# Patient Record
Sex: FEMALE | URBAN - METROPOLITAN AREA
[De-identification: names, ages, dates, MRNs, and addresses within clinical notes are randomized per-mention and may not be internally consistent; named-entity substitution may affect disease eponyms.]

---

## 2021-01-25 ENCOUNTER — OFFICE VISIT (OUTPATIENT)
Dept: URBAN - METROPOLITAN AREA CLINIC 83 | Facility: CLINIC | Age: 86
End: 2021-01-25
Payer: MEDICARE

## 2021-01-25 DIAGNOSIS — H43.813 VITREOUS DEGENERATION, BILATERAL: ICD-10-CM

## 2021-01-25 PROCEDURE — 99213 OFFICE O/P EST LOW 20 MIN: CPT | Performed by: OPHTHALMOLOGY

## 2021-01-25 PROCEDURE — 67028 INJECTION EYE DRUG: CPT | Performed by: OPHTHALMOLOGY

## 2021-01-25 PROCEDURE — 92134 CPTRZ OPH DX IMG PST SGM RTA: CPT | Performed by: OPHTHALMOLOGY

## 2021-01-25 ASSESSMENT — INTRAOCULAR PRESSURE
OS: 17
OD: 16

## 2021-01-25 NOTE — IMPRESSION/PLAN
Impression: Exudative age-related macular degeneration, bilateral, with active choroidal neovascularization: H35.3231. Bilateral.
s/p Avastin OD 9/28/2020 Plan: --exam/OCT reveal mild, stable SRF OD, stable mac scar OS
--findings d/w pt, rec cont anti-VEGF tx OD to maintain vision --pt understands tx may not improve VA but should reduce the risk of further visual loss --r/b/a of Avastin OD for NVAMD discussed, pt understands Avastin is considered off-label for NVAMD
--pt elects to cont Avastin under OCT guidance, injection performed successfully w/o complication --pt instructed to call immediately with s/s VA loss/pain RTC 12 weeks OCT OU reeval Avastin

## 2021-09-13 ENCOUNTER — OFFICE VISIT (OUTPATIENT)
Dept: URBAN - METROPOLITAN AREA CLINIC 83 | Facility: CLINIC | Age: 86
End: 2021-09-13
Payer: MEDICARE

## 2021-09-13 PROCEDURE — 92134 CPTRZ OPH DX IMG PST SGM RTA: CPT | Performed by: OPHTHALMOLOGY

## 2021-09-13 PROCEDURE — 67028 INJECTION EYE DRUG: CPT | Performed by: OPHTHALMOLOGY

## 2021-09-13 PROCEDURE — 99214 OFFICE O/P EST MOD 30 MIN: CPT | Performed by: OPHTHALMOLOGY

## 2021-09-13 ASSESSMENT — INTRAOCULAR PRESSURE
OD: 20
OS: 17

## 2021-09-13 NOTE — IMPRESSION/PLAN
Impression: Exudative age-related macular degeneration, bilateral, with active choroidal neovascularization: H35.3231. Bilateral.
s/p Avastin OD 5/10/21 Plan: --pt lost to follow-up for 4 months
--exam/OCT reveal new-onset SRH (temporal), lg PED, increased SRF OD
--findings d/w pt, rec cont anti-VEGF OD to maintain vision  
--r/b/a of Avastin OD for NVAMD discussed --pt understands Avastin is considered off-label for NVAMD
--pt elects to cont Avastin under OCT guidance --injection OD performed successfully w/o complication --pt instructed to call immediately with s/s VA loss/pain RTC 6 weeks for Avastin OD 2/3

## 2021-10-18 ENCOUNTER — PROCEDURE (OUTPATIENT)
Dept: URBAN - METROPOLITAN AREA CLINIC 83 | Facility: CLINIC | Age: 86
End: 2021-10-18
Payer: MEDICARE

## 2021-10-18 DIAGNOSIS — H35.3231 EXUDATIVE AGE-RELATED MACULAR DEGENERATION, BILATERAL, WITH ACTIVE CHOROIDAL NEOVASCULARIZATION: Primary | ICD-10-CM

## 2021-10-18 PROCEDURE — 67028 INJECTION EYE DRUG: CPT | Performed by: OPHTHALMOLOGY

## 2021-10-18 ASSESSMENT — INTRAOCULAR PRESSURE
OD: 13
OS: 14

## 2021-11-15 ENCOUNTER — PROCEDURE (OUTPATIENT)
Dept: URBAN - METROPOLITAN AREA CLINIC 83 | Facility: CLINIC | Age: 86
End: 2021-11-15
Payer: MEDICARE

## 2021-11-15 PROCEDURE — 67028 INJECTION EYE DRUG: CPT | Performed by: OPHTHALMOLOGY

## 2021-11-15 ASSESSMENT — INTRAOCULAR PRESSURE
OD: 5
OS: 9

## 2021-12-20 ENCOUNTER — OFFICE VISIT (OUTPATIENT)
Dept: URBAN - METROPOLITAN AREA CLINIC 83 | Facility: CLINIC | Age: 86
End: 2021-12-20
Payer: MEDICARE

## 2021-12-20 DIAGNOSIS — Z96.1 PRESENCE OF INTRAOCULAR LENS: ICD-10-CM

## 2021-12-20 PROCEDURE — 99213 OFFICE O/P EST LOW 20 MIN: CPT | Performed by: OPHTHALMOLOGY

## 2021-12-20 PROCEDURE — 67028 INJECTION EYE DRUG: CPT | Performed by: OPHTHALMOLOGY

## 2021-12-20 PROCEDURE — 92134 CPTRZ OPH DX IMG PST SGM RTA: CPT | Performed by: OPHTHALMOLOGY

## 2021-12-20 ASSESSMENT — INTRAOCULAR PRESSURE
OD: 12
OS: 11

## 2021-12-20 NOTE — IMPRESSION/PLAN
Impression: Exudative age-related macular degeneration, bilateral, with active choroidal neovascularization: H35.3231. Bilateral.
s/p Avastin OD 11/15/21
last DFE OU 09/13/21 Plan: --exam/OCT reveal improving SRH (temporal), stable PED, improving SRF OD
--findings d/w pt, rec cont anti-VEGF OD to maintain vision  
--r/b/a of Avastin OD for NVAMD discussed --pt understands Avastin is considered off-label for NVAMD
--pt elects to cont Avastin under OCT guidance --injection OD performed successfully w/o complication --pt instructed to call immediately with s/s VA loss/pain RTC 6 weeks for Avastin OD #2/3 (new series)

## 2022-01-31 ENCOUNTER — PROCEDURE (OUTPATIENT)
Dept: URBAN - METROPOLITAN AREA CLINIC 83 | Facility: CLINIC | Age: 87
End: 2022-01-31
Payer: MEDICARE

## 2022-01-31 PROCEDURE — 67028 INJECTION EYE DRUG: CPT | Performed by: OPHTHALMOLOGY

## 2022-01-31 ASSESSMENT — INTRAOCULAR PRESSURE
OS: 14
OD: 15

## 2022-03-14 ENCOUNTER — PROCEDURE (OUTPATIENT)
Dept: URBAN - METROPOLITAN AREA CLINIC 83 | Facility: CLINIC | Age: 87
End: 2022-03-14
Payer: MEDICARE

## 2022-03-14 PROCEDURE — 67028 INJECTION EYE DRUG: CPT | Performed by: OPHTHALMOLOGY

## 2022-03-14 ASSESSMENT — INTRAOCULAR PRESSURE
OS: 16
OD: 18

## 2022-04-25 ENCOUNTER — OFFICE VISIT (OUTPATIENT)
Dept: URBAN - METROPOLITAN AREA CLINIC 83 | Facility: CLINIC | Age: 87
End: 2022-04-25
Payer: MEDICARE

## 2022-04-25 DIAGNOSIS — H35.3231 EXUDATIVE AGE-RELATED MACULAR DEGENERATION, BILATERAL, WITH ACTIVE CHOROIDAL NEOVASCULARIZATION: Primary | ICD-10-CM

## 2022-04-25 DIAGNOSIS — Z96.1 PRESENCE OF INTRAOCULAR LENS: ICD-10-CM

## 2022-04-25 DIAGNOSIS — H43.813 VITREOUS DEGENERATION, BILATERAL: ICD-10-CM

## 2022-04-25 PROCEDURE — 99213 OFFICE O/P EST LOW 20 MIN: CPT | Performed by: OPHTHALMOLOGY

## 2022-04-25 PROCEDURE — 92134 CPTRZ OPH DX IMG PST SGM RTA: CPT | Performed by: OPHTHALMOLOGY

## 2022-04-25 PROCEDURE — 67028 INJECTION EYE DRUG: CPT | Performed by: OPHTHALMOLOGY

## 2022-04-25 ASSESSMENT — INTRAOCULAR PRESSURE
OS: 17
OD: 20

## 2022-04-25 NOTE — IMPRESSION/PLAN
Impression: Exudative age-related macular degeneration, bilateral, with active choroidal neovascularization: H35.3231. Bilateral.
s/p Avastin OD 3/14/22
last DFE OU 12/20/21 Plan: --exam/OCT reveal stable PED, improving SRF, and resolved SRH OD
--findings d/w pt, rec cont anti-VEGF OD to maintain vision  
--r/b/a of Avastin OD for NVAMD discussed --pt understands Avastin is considered off-label for NVAMD
--pt elects to cont Avastin under OCT guidance --injection OD performed successfully w/o complication --pt instructed to call immediately with s/s VA loss/pain RTC 6 weeks for Avastin OD #2/3 (straight)

## 2022-07-18 ENCOUNTER — OFFICE VISIT (OUTPATIENT)
Dept: URBAN - METROPOLITAN AREA CLINIC 83 | Facility: CLINIC | Age: 87
End: 2022-07-18
Payer: MEDICARE

## 2022-07-18 DIAGNOSIS — H43.813 VITREOUS DEGENERATION, BILATERAL: ICD-10-CM

## 2022-07-18 DIAGNOSIS — Z96.1 PRESENCE OF INTRAOCULAR LENS: ICD-10-CM

## 2022-07-18 DIAGNOSIS — H35.3231 EXUDATIVE AGE-RELATED MACULAR DEGENERATION, BILATERAL, WITH ACTIVE CHOROIDAL NEOVASCULARIZATION: Primary | ICD-10-CM

## 2022-07-18 PROCEDURE — 92134 CPTRZ OPH DX IMG PST SGM RTA: CPT | Performed by: OPHTHALMOLOGY

## 2022-07-18 PROCEDURE — 99213 OFFICE O/P EST LOW 20 MIN: CPT | Performed by: OPHTHALMOLOGY

## 2022-07-18 PROCEDURE — 67028 INJECTION EYE DRUG: CPT | Performed by: OPHTHALMOLOGY

## 2022-07-18 ASSESSMENT — INTRAOCULAR PRESSURE
OS: 11
OD: 15

## 2022-07-18 NOTE — IMPRESSION/PLAN
Impression: Exudative age-related macular degeneration, bilateral, with active choroidal neovascularization: H35.3231. Bilateral.
s/p Avastin OD 04/25/22
last DFE OU 04/25/22 Plan: --pt has been lost to follow-up for 12 weeks --exam/OCT reveal stable PED, worsening SRF OD
--findings d/w pt, rec cont anti-VEGF OD to maintain vision  
--r/b/a of Avastin OD for NVAMD discussed --pt understands Avastin is considered off-label for NVAMD
--pt elects to cont Avastin under OCT guidance --injection OD performed successfully w/o complication --pt instructed to call immediately with s/s VA loss/pain RTC 6 weeks for Avastin OD #2/3 (straight)

## 2022-08-29 ENCOUNTER — PROCEDURE (OUTPATIENT)
Dept: URBAN - METROPOLITAN AREA CLINIC 83 | Facility: CLINIC | Age: 87
End: 2022-08-29
Payer: MEDICARE

## 2022-08-29 DIAGNOSIS — H35.3231 EXUDATIVE AGE-RELATED MACULAR DEGENERATION, BILATERAL, WITH ACTIVE CHOROIDAL NEOVASCULARIZATION: Primary | ICD-10-CM

## 2022-08-29 PROCEDURE — 67028 INJECTION EYE DRUG: CPT | Performed by: OPHTHALMOLOGY

## 2022-08-29 ASSESSMENT — INTRAOCULAR PRESSURE
OS: 18
OD: 16

## 2022-10-17 ENCOUNTER — OFFICE VISIT (OUTPATIENT)
Dept: URBAN - METROPOLITAN AREA CLINIC 83 | Facility: CLINIC | Age: 87
End: 2022-10-17
Payer: MEDICARE

## 2022-10-17 DIAGNOSIS — H35.3231 EXUDATIVE AGE-RELATED MACULAR DEGENERATION, BILATERAL, WITH ACTIVE CHOROIDAL NEOVASCULARIZATION: Primary | ICD-10-CM

## 2022-10-17 PROCEDURE — 67028 INJECTION EYE DRUG: CPT | Performed by: OPHTHALMOLOGY

## 2022-10-17 ASSESSMENT — INTRAOCULAR PRESSURE
OS: 21
OD: 20